# Patient Record
Sex: FEMALE | Race: WHITE | Employment: OTHER | URBAN - METROPOLITAN AREA
[De-identification: names, ages, dates, MRNs, and addresses within clinical notes are randomized per-mention and may not be internally consistent; named-entity substitution may affect disease eponyms.]

---

## 2019-04-29 ENCOUNTER — HOSPITAL ENCOUNTER (OUTPATIENT)
Dept: PHYSICAL THERAPY | Age: 69
Discharge: HOME | End: 2019-04-29
Payer: MEDICARE

## 2019-04-29 PROCEDURE — 97162 PT EVAL MOD COMPLEX 30 MIN: CPT

## 2019-04-29 PROCEDURE — 97112 NEUROMUSCULAR REEDUCATION: CPT

## 2019-04-29 NOTE — PROGRESS NOTES
Radha Sims [address] [facility] PHYSICAL THERAPY AT [address] [facility], [address] [facility], [address], [address] [place] [address] - Phone: [phone]  Fax: [phone] PLAN OF CARE / STATEMENT OF MEDICAL NECESSITY FOR PHYSICAL THERAPY SERVICES Patient Name: Tomas Knight : 1950 Medical  
Diagnosis: Other abnormalities of gait and mobility [R26.89] Treatment Diagnosis: Gait/ balance dysfunction, 
dizziness Onset Date: Ongoing x 4 years Referral Source: Nikos Soto MD Start of Care [facility]): 2019 Prior Hospitalization: See medical history Provider #: [id number] Prior Level of Function: Progressive worsening of dizziness and balance with amb Comorbidities: Aortic valve replacement, pacemaker, depression, diabetes, arthritis, thyroid problem, asthma, CA-lymphoma with lymphedema Medications: Verified on Patient Summary List  
 
The Plan of Care and following information is based on the information from the initial evaluation.  
 
================================================================================== Assessment / key information:   Tomas Knight is a 80 y.o.  yo female with Dx of Other abnormalities of gait and mobility [R26.89] and dizziness. She reports a 4 year history without known cause. Patient reports dizziness upon standing and walking which lasts the duration of standing and walking. Symptoms are absent with sitting. She reports a few falls over the years, the last one being in fall 2018. Objectively, the patient demonstrates decreased static/dynamic functional balance, diminished ambulatory balance (FGA = 16/30 points) and dizziness with ADLs ([address] = 56/96 points). Ms. Arellano Cools with straight cane, with Trendelenberg. Oculo-motor testing is positive with Smooth Pursuit and Saccades. Cx ROM: flex= WNL, Ext= 50%, R/L rot= 50%/ 75%.   Pt will benefit from PT/Vestibular rehab to address these deficits, improve functional independence and reduce dizziness and imbalance with normal daily activities. Thank you for this referral. =========================================================================================== Eval Complexity: History HIGH Complexity :3+ comorbidities / personal factors will impact the outcome/ POC ;  Examination  HIGH Complexity : 4+ Standardized tests and measures addressing body structure, function, activity limitation and / or participation in recreation ; Presentation MEDIUM Complexity : Evolving with changing characteristics ; Decision Making Other outcome measures DHI  MEDIUM; Overall Complexity MEDIUM Problem List: impaired gait/ balance, decrease ADL/ functional abilitiies, decrease activity tolerance and decrease transfer abilities Treatment Plan may include any combination of the following: Therapeutic exercise, Therapeutic activities, Neuromuscular re-education, Gait/balance training and Patient education Patient / Family readiness to learn indicated by: asking questions, trying to perform skills and interestPersons(s) to be included in education: patient (P) Barriers to Learning/Limitations: None Measures taken, if barriers to learning:   
Patient Goal (s): Get rid of the dizziness Self perceived health status: fair Rehabilitation Potential: fair ? Short Term Goals: To be accomplished in 4-6  treatments: 1. Patient will report at least 25% reduction of symptoms with ADLs. 2. Patient will be independent and complaint with HEP TID to reduce imbalance and dizziness with ADLs. 3. FGA will improve to greater than or equal to 18/30points to demonstrate reduction of dizziness and imbalance with ADLs. ? Long Term Goals: To be accomplished in 10-12 treatments: 1. Patient will report at least 50% reduction of symptoms with ADLs.  
2. Patient will be independent with self progression of HEP and demonstrate willingness to continue HEP after D/C to maximize/maintain gains in functional mobility. 3. DHI will improve to less than or equal to 30/96 points to demonstrate significant reduction of dizziness and imbalance with ADLs. 4. FGA will improve to greater then or equal to 20/30 points to demonstrate a significant improvement in ambulatory balance. Frequency / Duration:   Patient to be seen  2  times per week for 4-6  weeks: 
Patient / Caregiver education and instruction: self care, activity modification and exercises Therapist Signature: Naima Andersen PT Date: 4/29/2019 Certification Period: 4/29/19 to 7/26/19 Time: 3:46 PM  
=========================================================================================== I certify that the above Physical Therapy Services are being furnished while the patient is under my care. I agree with the treatment plan and certify that this therapy is necessary. Physician Signature:       Date:      Time:  Please sign and return to [facility] or you may fax the signed copy to [phone]. Thank you.

## 2019-04-29 NOTE — PROGRESS NOTES
PHYSICAL THERAPY - DAILY TREATMENT NOTE Patient Name: Liliya Briggs        Date: 2019 : 1950   YES Patient  Verified Visit #:   1     Insurance: Payor: Woodrow Signs / Plan: [place] & B / Product Type: Medicare / In time: 250 Out time: 46 Total Treatment Time: 54 Medicare/BCBS Time Tracking (below) Total Timed Codes (min):  10 1:1 Treatment Time:  54 TREATMENT AREA =  Other abnormalities of gait and mobility [R26.89] SUBJECTIVE Pain Level (on 0 to 10 scale):  0  / 10 Medication Changes/New allergies or changes in medical history, any new surgeries or procedures? NO    If yes, update Summary List  
Subjective Functional Status/Changes:  []  No changes reported See mundo Carreno OBJECTIVE Modalities Rationale:     decrease pain to improve patient's ability to return to PLOF    
 min [] Estim, type/location:   
                                 []  att     []  unatt     []  w/US     []  w/ice    []  w/heat 
 min []  Mechanical Traction: type/lbs   
               []  pro   []  sup   []  int   []  cont    []  before manual    []  after manual  
 min []  Ultrasound, settings/location:    
 min []  Iontophoresis w/ dexamethasone, location:   
                                           []  take home patch       []  in clinic  
 min []  Ice     []  Heat    location/position:   
 min []  Vasopneumatic Device, press/temp:   
 min []  Other:   
[] Skin assessment post-treatment (if applicable):   
[]  intact    []  redness- no adverse reaction    
[]redness  adverse reaction:   
 
 min Therapeutic Exercise:  [x]  See flow sheet Rationale:      increase ROM and increase strength to improve the patients ability to return to PLOF  
 min Manual Therapy: Technique:     
[] S/DTM []IASTM []PROM [] Passive Stretching  
[]manual TPR []Jt manipulation:Gr I [] II []  III [] IV[] []REIL with manual OP Treatment Area: Rationale:      decrease pain, increase ROM, increase tissue extensibility and decrease trigger points to improve patient's ability to return to PLOF 10 min Neuromuscular Re-ed: [x]  See flow sheet Rationale:      improve coordination, improve balance, increase proprioception and dec dizziness to improve the patients ability to return to PLOF  
 min Self Care:   
Rationale:    increase ROM, increase strength and improve coordination to improve the patients ability to return to PLOF Billed With/As: 
 [] TE 
 [] TA 
 [] Neuro 
 [] Self Care Patient Education: [x] Review HEP [] Progressed/Changed HEP based on:  
[] positioning   [] body mechanics   [] transfers   [] heat/ice application   
[] other:   
Other Objective/Functional Measures: 
 
See eval/ POC Post Treatment Pain Level (on 0 to 10) scale:   0  / 10 ASSESSMENT 
 
X  See POC PLAN [x]  Upgrade activities as tolerated {YES) Continue plan of care  
[]  Discharge due to :   
[]  Other:   
 
Therapist: Rodolfo See PT Date: 4/29/2019 Time: 3:45 PM  
 
Future Appointments Date Time Provider Modesto Pennington 5/1/2019  3:00 PM Radha Silver PT REHAB CENTER AT [facility]  
5/6/2019  5:30 PM Radha Silver PT REHAB [facility]ENTER AT [facility]  
5/8/2019  3:00 PM Mary Estrada PT REHAB CENTER AT [facility]  
5/13/2019  3:00 PM Radha Silver PT REHAB CENTER AT [facility]

## 2019-05-01 ENCOUNTER — HOSPITAL ENCOUNTER (OUTPATIENT)
Dept: PHYSICAL THERAPY | Age: 69
Discharge: HOME | End: 2019-05-01
Payer: MEDICARE

## 2019-05-01 PROCEDURE — 97110 THERAPEUTIC EXERCISES: CPT

## 2019-05-01 PROCEDURE — 97112 NEUROMUSCULAR REEDUCATION: CPT

## 2019-05-01 NOTE — PROGRESS NOTES
PHYSICAL THERAPY - DAILY TREATMENT NOTE Patient Name: Michaela Ceja        Date: 2019 : 1950   YES Patient  Verified Visit #:   2   of   8  Insurance: Payor: Syeda Adame / Plan: [place] MEDICARE PART A & B / Product Type: Medicare / In time: 305 Out time: 46 Total Treatment Time: 40 Medicare/BCBS Time Tracking (below) Total Timed Codes (min):  40 1:1 Treatment Time:  40 TREATMENT AREA =  Other abnormalities of gait and mobility [R26.89] SUBJECTIVE Pain Level (on 0 to 10 scale):  0  / 10 Medication Changes/New allergies or changes in medical history, any new surgeries or procedures? NO    If yes, update Summary List  
Subjective Functional Status/Changes:  []  No changes reported Functional improvements: Pt performing HEP as indicated by PT. Functional impairments: Dizziness with ADLs, amb OBJECTIVE 40 min Neuromuscular Re-ed: [x]  See flow sheet Rationale:      improve coordination, improve balance and increase proprioception to improve the patients ability to amb with less dizziness Billed With/As: 
 [] TE 
 [] TA [x] Neuro 
 [] Self Care Patient Education: [x] Review HEP [] Progressed/Changed HEP based on:  
[] positioning   [] body mechanics   [] transfers   [] heat/ice application   
[] other:   
Other Objective/Functional Measures: 
 
Initiated static and dynamic balance activity. Post Treatment Pain Level (on 0 to 10) scale:   0  / 10 ASSESSMENT Assessment/Changes in Function: Pt with dizziness noted during saccades and smooth pursuit, static and dynamic balance activity. []  See Progress Note/Recertification Patient will continue to benefit from skilled PT services to modify and progress therapeutic interventions, address functional mobility deficits, address ROM deficits, address strength deficits, analyze and address soft tissue restrictions, analyze and cue movement patterns, analyze and modify body mechanics/ergonomics, assess and modify postural abnormalities and address imbalance/dizziness to attain remaining goals. Progress toward goals / Updated goals: 2. Patient will be independent and complaint with HEP TID to reduce imbalance and dizziness with ADLs--good progress with I with visual exercises and not met with new updated HEP of MSR and ROM EO. PLAN [x]  Upgrade activities as tolerated YES Continue plan of care  
[]  Discharge due to :   
[]  Other:   
 
Therapist: Thelma Lyman PT Date: 5/1/2019 Time: 3:16 PM  
 
Future Appointments Date Time Provider Modesto Pennington 5/6/2019  5:30 PM J Carlos Cazares, PT REHAB CENTER AT [facility]  
5/8/2019  3:00 PM Iman Kurtz, PT REHAB CENTER AT [facility]  
5/13/2019  3:00 PM J Carlos Cazares, PT REHAB CENTER AT [facility]

## 2019-05-06 ENCOUNTER — HOSPITAL ENCOUNTER (OUTPATIENT)
Dept: PHYSICAL THERAPY | Age: 69
Discharge: HOME | End: 2019-05-06
Payer: MEDICARE

## 2019-05-06 PROCEDURE — 97112 NEUROMUSCULAR REEDUCATION: CPT

## 2019-05-06 NOTE — PROGRESS NOTES
PHYSICAL THERAPY - DAILY TREATMENT NOTE Patient Name: Mark Tello        Date: 2019 : 1950   YES Patient  Verified Visit #:   3  of   8  Insurance: Payor: Viva Perkins / Plan: [place] MEDICARE PART A & B / Product Type: Medicare / In time: 535 Out time: 615 Total Treatment Time: 40 Medicare/BCBS Time Tracking (below) Total Timed Codes (min):  40 1:1 Treatment Time:  45 TREATMENT AREA =  Other abnormalities of gait and mobility [R26.89] SUBJECTIVE Pain Level (on 0 to 10 scale):  0  / 10 Medication Changes/New allergies or changes in medical history, any new surgeries or procedures? NO    If yes, update Summary List  
Subjective Functional Status/Changes:  []  No changes reported Functional improvements: none per pt. Pt has been performing her HEP and notes dizziness with eye movements. Functional impairments: Dizziness with ADLs, amb OBJECTIVE 
40--38 1:1 min Neuromuscular Re-ed: [x]  See flow sheet Rationale:      improve coordination, improve balance and increase proprioception to improve the patients ability to amb with less dizziness Billed With/As: 
 [] TE 
 [] TA [x] Neuro 
 [] Self Care Patient Education: [x] Review HEP [] Progressed/Changed HEP based on:  
[] positioning   [] body mechanics   [] transfers   [] heat/ice application   
[] other:   
Other Objective/Functional Measures: 
 
Advanced static and dynamic balance activity and added VSE seated for horizontal and vertical head turns at 38bpm. 
 
  
Post Treatment Pain Level (on 0 to 10) scale:   0  / 10 ASSESSMENT Assessment/Changes in Function: Pt with dizziness noted during VSE with vertical head movement. []  See Progress Note/Recertification Patient will continue to benefit from skilled PT services to modify and progress therapeutic interventions, address functional mobility deficits, address ROM deficits, address strength deficits, analyze and address soft tissue restrictions, analyze and cue movement patterns, analyze and modify body mechanics/ergonomics, assess and modify postural abnormalities and address imbalance/dizziness to attain remaining goals. Progress toward goals / Updated goals: 2. Patient will be independent and complaint with HEP TID to reduce imbalance and dizziness with ADLs--good progress with addition of VSE-reassess level of I at next visit. PLAN [x]  Upgrade activities as tolerated YES Continue plan of care  
[]  Discharge due to :   
[]  Other:   
 
Therapist: Arvin Mendoza PT Date: 5/6/2019 Time: 6 PM  
 
Future Appointments Date Time Provider Modesto Pennington 5/8/2019  3:00 PM Winsome Rodriguez, PT REH[facility] AT [facility]  
5/13/2019  3:00 PM Janna Alston, PT REHAB [facility]ENTER AT [facility]

## 2019-05-08 ENCOUNTER — HOSPITAL ENCOUNTER (OUTPATIENT)
Dept: PHYSICAL THERAPY | Age: 69
Discharge: HOME | End: 2019-05-08
Payer: MEDICARE

## 2019-05-08 PROCEDURE — 97112 NEUROMUSCULAR REEDUCATION: CPT

## 2019-05-08 NOTE — PROGRESS NOTES
PHYSICAL THERAPY - DAILY TREATMENT NOTE Patient Name: Bruce Lopez        Date: 2019 : 1950   YES Patient  Verified Visit #:   4   of   8  Insurance: Payor: Virgilio Perea / Plan: Tejinder Avileswindy / Product Type: Medicare / In time: 305 Out time: 340 Total Treatment Time: 35 Medicare/[facility] Time Tracking (below) Total Timed Codes (min):  35 1:1 Treatment Time:  25 TREATMENT AREA =  Other abnormalities of gait and mobility [R26.89] SUBJECTIVE Pain Level (on 0 to 10 scale):  8  / 10 Medication Changes/New allergies or changes in medical history, any new surgeries or procedures? NO    If yes, update Summary List  
Subjective Functional Status/Changes:  []  No changes reported Very dizzy. Not feeling well. Not doing ex as much as I should OBJECTIVE 
 
35, 25 1:1 min Neuromuscular Re-ed: [x]  See flow sheet Rationale:      improve balance and dec diZziness to improve the patients ability to perform ADLs. Billed With/As: 
 [] TE 
 [] TA 
 [] Neuro 
 [] Self Care Patient Education: [x] Review HEP [] Progressed/Changed HEP based on:  
[] positioning   [] body mechanics   [] transfers   [] heat/ice application   
[] other:   
Other Objective/Functional Measures: 
 
Ataxic gait. Progressed dynamic gait-- added retro amb Able to maintain static balance for longer durations--see flowsheet Returned to smooth pursuit--pt tolerated with min c/o Post Treatment Pain Level (on 0 to 10) scale:   <8  / 10 ASSESSMENT Assessment/Changes in Function:  
  
 
Functional limitation:  constant dizziness except when lying down 
   
 
  
Patient will continue to benefit from skilled PT services to modify and progress therapeutic interventions, address functional mobility deficits and address imbalance/dizziness to attain remaining goals.   
Progress toward goals / Updated goals: 
 
Slow Progress to    [x] STG    [] LTG  2 as shown by pt report 
  
 []  See Progress Note/Recertification  
  
PLAN [x]  Upgrade activities as tolerated {YES) Continue plan of care  
[]  Discharge due to :   
[]  Other:   
 
Therapist: Naima Andersen PT Date: 5/8/2019 Time: 3:04 PM  
 
Future Appointments Date Time Provider Modesto Pennington 5/13/2019  3:00 PM Caridad Hale, PT [facility] AT [facility]  
5/15/2019  3:00 PM Brandan Robles PT REHAB [facility]ENTER AT [facility]  
5/20/2019  2:00 PM Brandan Robles PT REH[facility]ENTER AT [facility]  
5/22/2019  2:00 PM Caridad Hale, PT REHAB [facility]ENTER AT [facility]

## 2019-05-13 ENCOUNTER — HOSPITAL ENCOUNTER (OUTPATIENT)
Dept: PHYSICAL THERAPY | Age: 69
Discharge: HOME | End: 2019-05-13
Payer: MEDICARE

## 2019-05-13 PROCEDURE — 97112 NEUROMUSCULAR REEDUCATION: CPT

## 2019-05-13 NOTE — PROGRESS NOTES
PHYSICAL THERAPY - DAILY TREATMENT NOTE Patient Name: Kerrie Delatorre        Date: 2019 : 1950   YES Patient  Verified Visit #:   5   of   8  Insurance: Payor: Adrianne Calloway / Plan: Tejinder Casey / Product Type: Medicare / In time: 305 Out time: 46 Total Treatment Time: 40 Medicare/BCBS Time Tracking (below) Total Timed Codes (min):  40 1:1 Treatment Time: 40 TREATMENT AREA =  Other abnormalities of gait and mobility [R26.89] SUBJECTIVE Pain Level (on 0 to 10 scale):  8  / 10 Medication Changes/New allergies or changes in medical history, any new surgeries or procedures? NO    If yes, update Summary List  
Subjective Functional Status/Changes:  []  No changes reported Pt reports doing exercises in community, even at [employer] and noting some improvements in balance. Did the eye exercises but \"A\" seems easier than tracking the post it. OBJECTIVE 40 min Neuromuscular Re-ed: [x]  See flow sheet Rationale:      improve balance and dec diZziness to improve the patients ability to perform ADLs,safe amb on uneven surfaces. Billed With/As: 
 [] TE 
 [] TA [x] Neuro 
 [] Self Care Patient Education: [x] Review HEP [] Progressed/Changed HEP based on:  
[] positioning   [] body mechanics   [] transfers   [] heat/ice application   
[] other:   
Other Objective/Functional Measures: 
 
Pt duration of static hold for static balance during MSR EO and EC. Pt visual exercises, gaze stabilization exercise progression this day with reduced symptoms response post-exercises. 55bpm H, Vertical VSE. Post Treatment Pain Level (on 0 to 10) scale:   <8  / 10 ASSESSMENT Assessment/Changes in Function:  
 Pt dynamic balance progression with reduction in A for HHT, VHT. Pt with increased A required for retro, EC dynamic balance.   
 
  
Patient will continue to benefit from skilled PT services to modify and progress therapeutic interventions, address functional mobility deficits and address imbalance/dizziness to attain remaining goals. Progress toward goals / Updated goals: 
 
Slow Progress to    [x] STG    [] LTG  2 met as demonstrated by pt ability to perform HEP I for review in clinic. 
  
[]  See Progress Note/Recertification  
  
PLAN [x]  Upgrade activities as tolerated {YES) Continue plan of care  
[]  Discharge due to :   
[]  Other:   
 
Therapist: Anderson Church PT Date: 5/13/2019 Time: 3:06 PM  
 
Future Appointments Date Time Provider Modesto Pennington 5/15/2019  3:00 PM Oleg Salter, PT REHAB [facility]ENTER AT [facility]  
5/20/2019  2:00 PM Oleg Salter, PT REH[facility]ENTER AT [facility]  
5/22/2019  2:00 PM Renard Nelson, PT REH[facility]ENTER AT [facility]

## 2019-05-15 ENCOUNTER — HOSPITAL ENCOUNTER (OUTPATIENT)
Dept: PHYSICAL THERAPY | Age: 69
Discharge: HOME | End: 2019-05-15
Payer: MEDICARE

## 2019-05-15 PROCEDURE — 97112 NEUROMUSCULAR REEDUCATION: CPT

## 2019-05-15 NOTE — PROGRESS NOTES
PHYSICAL THERAPY - DAILY TREATMENT NOTE Patient Name: Sarah Perez        Date: 5/15/2019 : 1950   YES Patient  Verified Visit #:   6   of   8  Insurance: Payor: Yuliet Camacho / Plan: Tejinder Bishnu Avileswindy / Product Type: Medicare / In time: 305 Out time: 355 Total Treatment Time: 50 Medicare/BCBS Time Tracking (below) Total Timed Codes (min):  50 1:1 Treatment Time:  50 TREATMENT AREA =  Other abnormalities of gait and mobility [R26.89] SUBJECTIVE Pain Level (on 0 to 10 scale):  8  / 10 Medication Changes/New allergies or changes in medical history, any new surgeries or procedures? NO    If yes, update Summary List  
Subjective Functional Status/Changes:  []  No changes reported Dizziness with standing and particularly with walking. Minimal dizziness with sitting OBJECTIVE 50 min Neuromuscular Re-ed: [x]  See flow sheet Rationale:      improve coordination, improve balance and dec dizziness to improve the patients ability to perform ADLs/ amb Billed With/As: 
 [] TE 
 [] TA 
 [] Neuro 
 [] Self Care Patient Education: [x] Review HEP [] Progressed/Changed HEP based on:  
[] positioning   [] body mechanics   [] transfers   [] heat/ice application   
[] other:   
Other Objective/Functional Measures: 
 
Progressed footing on several static balance activities--see flow sheet for specifics Inc speed/ duration with VSE Added VVI Post Treatment Pain Level (on 0 to 10) scale:   8  / 10 ASSESSMENT Assessment/Changes in Function:  
  
Functional improvement:  progressing balance Functional limitation:  dizzy with standing. Using cane for amb 
   
 
  
Patient will continue to benefit from skilled PT services to modify and progress therapeutic interventions, address functional mobility deficits and address imbalance/dizziness to attain remaining goals. Progress toward goals / Updated goals: 
 
Progressing there ex/ HEP.   Improving balance 
  
 []  See Progress Note/Recertification  
  
PLAN [x]  Upgrade activities as tolerated {YES) Continue plan of care  
[]  Discharge due to :   
[]  Other:   
 
Therapist: Erick Ramos PT Date: 5/15/2019 Time: 3:09 PM  
 
Future Appointments Date Time Provider Modesto Pennington 5/20/2019  2:00 PM Rina Longoria, PT REHAB [facility]  
5/22/2019  2:00 PM Mari Garcia, PT REH[facility] AT [facility]

## 2019-05-20 ENCOUNTER — HOSPITAL ENCOUNTER (OUTPATIENT)
Dept: PHYSICAL THERAPY | Age: 69
Discharge: HOME | End: 2019-05-20
Payer: MEDICARE

## 2019-05-20 PROCEDURE — 97112 NEUROMUSCULAR REEDUCATION: CPT

## 2019-05-20 NOTE — PROGRESS NOTES
PHYSICAL THERAPY - DAILY TREATMENT NOTE Patient Name: James Mitchell        Date: 2019 : 1950   YES Patient  Verified Visit #:   7   of   8  Insurance: Payor: Nora Calderon / Plan: Tejinder Casey / Product Type: Medicare / In time: 200 Out time: 255 Total Treatment Time: 54 Medicare/[facility] Time Tracking (below) Total Timed Codes (min):  55 1:1 Treatment Time:  30 TREATMENT AREA =  Other abnormalities of gait and mobility [R26.89] SUBJECTIVE Pain Level (on 0 to 10 scale):   10 Medication Changes/New allergies or changes in medical history, any new surgeries or procedures? NO    If yes, update Summary List  
Subjective Functional Status/Changes:  []  No changes reported Staying the same. Dizziness every day. I really want them to re-do the scan. Discouraged. Didn't do ex as much as I should have Overall- 15% improvement OBJECTIVE 
 
 
  
55, 30 1:1 min Neuromuscular Re-ed: [x]  See flow sheet Rationale:      improve coordination, improve balance and dec dizziness to improve the patients ability to perform ADLs with dec Sx Billed With/As: 
 [] TE 
 [] TA [x] Neuro 
 [] Self Care Patient Education: [x] Review HEP [] Progressed/Changed HEP based on:  
[] positioning   [] body mechanics   [] transfers   [] heat/ice application   
[] other:   
Other Objective/Functional Measures: 
 
Reports dizziness after smooth pursuit and saccades this day Increased speed with VSE to 65 Hz--corrected pt form Added metronome at 50 Hz (100 bpm) to VVI Post Treatment Pain Level (on 0 to 10) scale:   8  / 10 ASSESSMENT Assessment/Changes in Function:  
  
Functional improvement:  went to bridge on one occasion without cane  
 
   
 
  
Patient will continue to benefit from skilled PT services to modify and progress therapeutic interventions, address functional mobility deficits and address imbalance/dizziness to attain remaining goals. Progress toward goals / Updated goals: 
 
Fair progress to STG 1 
  
[]  See Progress Note/Recertification  
  
PLAN [x]  Upgrade activities as tolerated {YES) Continue plan of care  
[]  Discharge due to :   
[]  Other:   
 
Therapist: Rainey Krabbe, PT Date: 5/20/2019 Time: 1:58 PM  
 
Future Appointments Date Time Provider Modesto Pennington 5/20/2019  2:00 PM Debora López, PT REH[facility] AT [facility]  
5/22/2019  2:00 PM Lawrence Hernandes, PT REH[facility]ENTER AT [facility]

## 2019-05-22 ENCOUNTER — APPOINTMENT (OUTPATIENT)
Dept: PHYSICAL THERAPY | Age: 69
End: 2019-05-22
Payer: MEDICARE

## 2019-05-24 ENCOUNTER — HOSPITAL ENCOUNTER (OUTPATIENT)
Dept: PHYSICAL THERAPY | Age: 69
Discharge: HOME | End: 2019-05-24
Payer: MEDICARE

## 2019-05-24 PROCEDURE — 97112 NEUROMUSCULAR REEDUCATION: CPT

## 2019-05-24 NOTE — PROGRESS NOTES
PHYSICAL THERAPY - DAILY TREATMENT NOTE     Patient Name: Keira Gonsalves        Date: 2019  : 1950   YES Patient  Verified  Visit #:   8 of   10  Insurance: Payor: Juan Ramon Beltrán / Plan: [place] MED[place] & B / Product Type: Medicare /      In time: 340 Out time: 320    Total Treatment Time: 40     Medicare/BCBS Time Tracking (below)   Total Timed Codes (min):  40 1:1 Treatment Time:  30     TREATMENT AREA =  Other abnormalities of gait and mobility [R26.89]    SUBJECTIVE    Pain Level (on 0 to 10 scale):  6  / 10   Medication Changes/New allergies or changes in medical history, any new surgeries or procedures? NO    If yes, update Summary List   Subjective Functional Status/Changes:  []  No changes reported   Pt is running late for appt, reports she is more dizzy today from rushing to get to appt. Notes overall slight improvement and does not want to stop since it is helping. She reports improvements in standing balance for HEP and ADLs. OBJECTIVE     1:1 min Neuromuscular Re-ed: [x]  See flow sheet   Rationale:      improve coordination and improve balance    to improve the patients ability to perform ADLs with improved safety, less dizziness    Billed With/As:   [] TE   [] TA   [x] Neuro   [] Self Care Patient Education: [x] Review HEP    [] Progressed/Changed HEP based on:   [] positioning   [] body mechanics   [] transfers   [] heat/ice application    [] other:        Other Objective/Functional Measures:    Pt with progression to braiding in parallel bars. Added visual tasking with HHT with pt unable to perform without using UE support x 1. Post Treatment Pain Level (on 0 to 10) scale:   8  / 10     ASSESSMENT  Assessment/Changes in Function:   Pt able to perform HHT with success x 2 rows, 8 words prior to inability to accurately report letter, color of letters in standing. Improved static and dynamic balance this day, however, dizziness unchanged.           Patient will continue to benefit from skilled PT services to modify and progress therapeutic interventions, address functional mobility deficits and address imbalance/dizziness to attain remaining goals.    Progress toward goals / Updated goals:.  2. Patient will be independent and complaint with HEP TID to reduce imbalance and dizziness with ADLs--met          PLAN    [x]  Upgrade activities as tolerated {YES) Continue plan of care   []  Discharge due to :    [x]  Other: Reassessment to be performed at next visit     Therapist: Yudelka Collins PT    Date: 5/24/2019 Time: 3:30 PM     Future Appointments   Date Time Provider Modesto Pennington   5/24/2019  3:30 PM Niyah Cage, PT [facility]

## 2019-06-17 ENCOUNTER — APPOINTMENT (OUTPATIENT)
Dept: PHYSICAL THERAPY | Age: 69
End: 2019-06-17
Payer: MEDICARE

## 2019-06-26 ENCOUNTER — APPOINTMENT (OUTPATIENT)
Dept: PHYSICAL THERAPY | Age: 69
End: 2019-06-26
Payer: MEDICARE

## 2019-07-01 ENCOUNTER — HOSPITAL ENCOUNTER (OUTPATIENT)
Dept: PHYSICAL THERAPY | Age: 69
Discharge: HOME | End: 2019-07-01
Payer: MEDICARE

## 2019-07-01 PROCEDURE — 97112 NEUROMUSCULAR REEDUCATION: CPT

## 2019-07-01 NOTE — PROGRESS NOTES
[address] PHYSICAL THERAPY AT [facility] [address] [place], [address] - Phone: [phone]  Fax: [phone]  DISCHARGE SUMMARY FOR PHYSICAL THERAPY          Patient Name: Roro Gomez : 1950   Treatment/Medical Diagnosis: Other abnormalities of gait and mobility [R26.89]   Onset Date: Ongoing x 4 years    Referral Source: Brandi Viera MD Start of [facility], [facility]): 19   Prior Hospitalization: See Medical History Provider #: [id number]   Prior Level of Function: Progressive worsening of dizziness and balance with amb   Comorbidities: Aortic valve replacement, pacemaker, depression, diabetes, arthritis, thyroid problem, asthma, CA-lymphoma with lymphedema   Medications: Verified on Patient Summary List   [facility] from [facility]: 10 Missed Visits: 0     Previous Goals:  1. Patient will report at least 25% reduction of symptoms with ADLs. 2. Patient will be independent and complaint with HEP TID to reduce imbalance and dizziness with ADLs. 3. FGA will improve to greater than or equal to 18/30points to demonstrate reduction of dizziness and imbalance with ADLs. Prior Level/Current Level:  1) Prior Level: Pt with dizziness with forward bending, rotation, and amb   Current Level: 20% improved   Goal Met? no  2) Prior Level: n/a   Current Level: Compliant and I   Goal Met? yes  3) Prior Level:16/30   Current Level: 18/30   Goal Met? yes    Key Functional Changes/Progress: Pt with improvements in balance, limited progression in dizziness symptoms with ADLs, IADLs. She will be D/C at this time at functional status as above. She is scheduled for pacemaker surgery and may return to PT at that time with updated POC pending MD approval.    Assessments/Recommendations: Pt will be D/C to HEP at this time secondary to limited progress at this time. If you have any questions/comments please contact us directly at [phone].    Thank you for allowing us to assist in the care of your patient.     Therapist Signature: Kyra Whitman, PT Date: 6/92/97   Certification Period:  Reporting Period:  --  -4/29/19-7/05/19 Time: 5PM

## 2019-07-01 NOTE — PROGRESS NOTES
PHYSICAL THERAPY - DAILY TREATMENT NOTE     Patient Name: Sharry Dance        Date: 2019  : 1950   YES Patient  Verified  Visit #:      16  Insurance: Payor: Rodríguez Dalton / Plan: [place] MEDICARE PART A & B / Product Type: Medicare /      In time: 430 Out time: 520   Total Treatment Time: 50     Medicare/BCBS Time Tracking (below)   Total Timed Codes (min):  50 1:1 Treatment Time:  25     TREATMENT AREA =  Other abnormalities of gait and mobility [R26.89]    SUBJECTIVE    Pain Level (on 0 to 10 scale):  3  / 10   Medication Changes/New allergies or changes in medical history, any new surgeries or procedures? NO    If yes, update Summary List   Subjective Functional Status/Changes:  []  No changes reported       Functional improvements: Pt reporting slight improvement in balance, fear of falling. Functional impairments: Dizziness continues with ADLs. OBJECTIVE      50 min Neuromuscular Re-ed: [x]  See flow sheet   Rationale:      improve coordination, improve balance and increase proprioception to improve the patients ability to perform safe amb in community. Billed With/As:   [] TE   [] TA   [] Neuro   [] Self Care Patient Education: [x] Review HEP    [] Progressed/Changed HEP based on:   [] positioning   [] body mechanics   [] transfers   [] heat/ice application    [] other:        Other Objective/Functional Measures:  See PN. Post Treatment Pain Level (on 0 to 10) scale:   3  / 10     ASSESSMENT    Assessment/Changes in Function:     See PN     [x]  See Progress Note/Recertification   Patient will continue to benefit from skilled PT services to modify and progress therapeutic interventions, address functional mobility deficits, analyze and cue movement patterns, analyze and modify body mechanics/ergonomics, assess and modify postural abnormalities and instruct in home and community integration to attain remaining goals.       Progress toward goals / Updated goals:    See PN.      PLAN    [x]  Upgrade activities as tolerated YES Continue plan of care   []  Discharge due to :    []  Other:      Therapist: Lavonne Miller PT    Date: 7/1/2019 Time: 2:44 PM     Future Appointments   Date Time Provider [place] Pennington   7/1/2019  4:30 PM Beata Alonzo PT [facility]

## 2019-07-05 ENCOUNTER — HOSPITAL ENCOUNTER (OUTPATIENT)
Dept: PHYSICAL THERAPY | Age: 69
Discharge: HOME | End: 2019-07-05
Payer: MEDICARE

## 2019-07-05 PROCEDURE — 97112 NEUROMUSCULAR REEDUCATION: CPT

## 2019-07-05 NOTE — PROGRESS NOTES
PHYSICAL THERAPY - DAILY TREATMENT NOTE     Patient Name: Jaqueline Barney        Date: 2019  : 1950   YES Patient  Verified  Visit #:   10  of   16  Insurance: Payor: Umer Li / Plan: [place] MEDICARE PART A & B / Product Type: Medicare /      In time: 4 Out time: 455   Total Treatment Time: 55     Medicare/BCBS Time Tracking (below)   Total Timed Codes (min):  55 1:1 Treatment Time:  38     TREATMENT AREA =  Other abnormalities of gait and mobility [R26.89]    SUBJECTIVE    Pain Level (on 0 to 10 scale):  3  / 10   Medication Changes/New allergies or changes in medical history, any new surgeries or procedures? NO    If yes, update Summary List   Subjective Functional Status/Changes:  []  No changes reported       Functional improvements: improvement slightly in standing balance and with walking but still fatiguing quickly. Functional impairments: Dizziness continues with ADLs. OBJECTIVE      50/38 min Neuromuscular Re-ed: [x]  See flow sheet   Rationale:      improve coordination, improve balance and increase proprioception to improve the patients ability to perform safe amb in community. Billed With/As:   [] TE   [] TA   [x] Neuro   [] Self Care Patient Education: [x] Review HEP    [] Progressed/Changed HEP based on:   [] positioning   [] body mechanics   [] transfers   [] heat/ice application    [] other:        Other Objective/Functional Measures:  Pt program modified to include VVI seated with increased jose of movement. Pt dynamic balance progression with retro, HHT with gait and EC with reduced UE support, PT A. Post Treatment Pain Level (on 0 to 10) scale:   3   10     ASSESSMENT    Assessment/Changes in Function:   Pt with fair response to VSE, VVI with mild symptoms x < 15 min in seated. Unable to perform standing secondary to LOB and VVI for HEP secondary to dizziness. Issued for HEP.     [x]  See Progress Note/Recertification   Patient will continue to benefit from skilled PT services to modify and progress therapeutic interventions, address functional mobility deficits, analyze and cue movement patterns, analyze and modify body mechanics/ergonomics, assess and modify postural abnormalities, address imbalance/dizziness and instruct in home and community integration to attain remaining goals. Progress toward goals / Updated goals:    1. Patient report at least 50% reduction of symptoms with ADLs--25% improvement in balance, dizziness 10%. PLAN    [x]  Upgrade activities as tolerated YES Continue plan of care   []  Discharge due to :    [x]  Other: Pt will be placed on hold related to upcoming cardiac surgery.      Therapist: Shola Sahni, PT    Date: 7/5/2019 Time: 4:30 PM     Future Appointments   Date Time Provider Modesto Pennington   7/5/2019  4:00 PM Lizeth Martinez, [facility]